# Patient Record
Sex: FEMALE | Race: WHITE
[De-identification: names, ages, dates, MRNs, and addresses within clinical notes are randomized per-mention and may not be internally consistent; named-entity substitution may affect disease eponyms.]

---

## 2019-01-17 ENCOUNTER — HOSPITAL ENCOUNTER (EMERGENCY)
Dept: HOSPITAL 10 - FTE | Age: 24
Discharge: HOME | End: 2019-01-17
Payer: COMMERCIAL

## 2019-01-17 ENCOUNTER — HOSPITAL ENCOUNTER (EMERGENCY)
Dept: HOSPITAL 91 - FTE | Age: 24
Discharge: HOME | End: 2019-01-17
Payer: COMMERCIAL

## 2019-01-17 VITALS — SYSTOLIC BLOOD PRESSURE: 108 MMHG | HEART RATE: 80 BPM | RESPIRATION RATE: 18 BRPM | DIASTOLIC BLOOD PRESSURE: 67 MMHG

## 2019-01-17 VITALS
BODY MASS INDEX: 12.75 KG/M2 | BODY MASS INDEX: 12.75 KG/M2 | HEIGHT: 71 IN | WEIGHT: 91.05 LBS | WEIGHT: 91.05 LBS | HEIGHT: 71 IN

## 2019-01-17 DIAGNOSIS — R10.11: Primary | ICD-10-CM

## 2019-01-17 DIAGNOSIS — R11.10: ICD-10-CM

## 2019-01-17 LAB
ADD MAN DIFF?: NO
ADD UMIC: NO
ALANINE AMINOTRANSFERASE: 16 IU/L (ref 13–69)
ALBUMIN/GLOBULIN RATIO: 1.53
ALBUMIN: 4.3 G/DL (ref 3.3–4.9)
ALKALINE PHOSPHATASE: 44 IU/L (ref 42–121)
AMYLASE: 72 U/L (ref 11–123)
ANION GAP: 7 (ref 5–13)
ASPARTATE AMINO TRANSFERASE: 22 IU/L (ref 15–46)
BASOPHIL #: 0 10^3/UL (ref 0–0.1)
BASOPHILS %: 0.5 % (ref 0–2)
BILIRUBIN,DIRECT: 0 MG/DL (ref 0–0.2)
BILIRUBIN,TOTAL: 0.4 MG/DL (ref 0.2–1.3)
BLOOD UREA NITROGEN: 14 MG/DL (ref 7–20)
CALCIUM: 9.5 MG/DL (ref 8.4–10.2)
CARBON DIOXIDE: 31 MMOL/L (ref 21–31)
CHLORIDE: 101 MMOL/L (ref 97–110)
CREATININE: 0.52 MG/DL (ref 0.44–1)
EOSINOPHILS #: 0.1 10^3/UL (ref 0–0.5)
EOSINOPHILS %: 1 % (ref 0–7)
GLOBULIN: 2.8 G/DL (ref 1.3–3.2)
GLUCOSE: 115 MG/DL (ref 70–220)
HEMATOCRIT: 39.5 % (ref 37–47)
HEMOGLOBIN: 13 G/DL (ref 12–16)
IMMATURE GRANS #M: 0.01 10^3/UL (ref 0–0.03)
IMMATURE GRANS % (M): 0.1 % (ref 0–0.43)
LIPASE: 57 U/L (ref 23–300)
LYMPHOCYTES #: 2.8 10^3/UL (ref 0.8–2.9)
LYMPHOCYTES %: 35.3 % (ref 15–51)
MEAN CORPUSCULAR HEMOGLOBIN: 30.7 PG (ref 29–33)
MEAN CORPUSCULAR HGB CONC: 32.9 G/DL (ref 32–37)
MEAN CORPUSCULAR VOLUME: 93.4 FL (ref 82–101)
MEAN PLATELET VOLUME: 9.5 FL (ref 7.4–10.4)
MONOCYTE #: 0.4 10^3/UL (ref 0.3–0.9)
MONOCYTES %: 4.6 % (ref 0–11)
NEUTROPHIL #: 4.6 10^3/UL (ref 1.6–7.5)
NEUTROPHILS %: 58.5 % (ref 39–77)
NUCLEATED RED BLOOD CELLS #: 0 10^3/UL (ref 0–0)
NUCLEATED RED BLOOD CELLS%: 0 /100WBC (ref 0–0)
PLATELET COUNT: 194 10^3/UL (ref 140–415)
POTASSIUM: 3.7 MMOL/L (ref 3.5–5.1)
RED BLOOD COUNT: 4.23 10^6/UL (ref 4.2–5.4)
RED CELL DISTRIBUTION WIDTH: 13 % (ref 11.5–14.5)
SODIUM: 139 MMOL/L (ref 135–144)
TOTAL PROTEIN: 7.1 G/DL (ref 6.1–8.1)
UR ASCORBIC ACID: 20 MG/DL
UR BILIRUBIN (DIP): NEGATIVE MG/DL
UR BLOOD (DIP): NEGATIVE MG/DL
UR CLARITY: (no result)
UR COLOR: YELLOW
UR GLUCOSE (DIP): NEGATIVE MG/DL
UR KETONES (DIP): (no result) MG/DL
UR LEUKOCYTE ESTERASE (DIP): NEGATIVE LEU/UL
UR MUCUS: (no result) /HPF
UR NITRITE (DIP): NEGATIVE MG/DL
UR PH (DIP): 5 (ref 5–9)
UR RBC: 3 /HPF (ref 0–5)
UR SPECIFIC GRAVITY (DIP): 1.02 (ref 1–1.03)
UR SQUAMOUS EPITHELIAL CELL: (no result) /HPF
UR TOTAL PROTEIN (DIP): NEGATIVE MG/DL
UR UROBILINOGEN (DIP): NEGATIVE MG/DL
UR WBC: 4 /HPF (ref 0–5)
WHITE BLOOD COUNT: 7.8 10^3/UL (ref 4.8–10.8)

## 2019-01-17 PROCEDURE — 76705 ECHO EXAM OF ABDOMEN: CPT

## 2019-01-17 PROCEDURE — 87086 URINE CULTURE/COLONY COUNT: CPT

## 2019-01-17 PROCEDURE — 85025 COMPLETE CBC W/AUTO DIFF WBC: CPT

## 2019-01-17 PROCEDURE — 80053 COMPREHEN METABOLIC PANEL: CPT

## 2019-01-17 PROCEDURE — 81001 URINALYSIS AUTO W/SCOPE: CPT

## 2019-01-17 PROCEDURE — 82150 ASSAY OF AMYLASE: CPT

## 2019-01-17 PROCEDURE — 36415 COLL VENOUS BLD VENIPUNCTURE: CPT

## 2019-01-17 PROCEDURE — 81025 URINE PREGNANCY TEST: CPT

## 2019-01-17 PROCEDURE — 81003 URINALYSIS AUTO W/O SCOPE: CPT

## 2019-01-17 PROCEDURE — 99284 EMERGENCY DEPT VISIT MOD MDM: CPT

## 2019-01-17 PROCEDURE — 83690 ASSAY OF LIPASE: CPT

## 2019-01-17 RX ADMIN — ALUMINUM HYDROXIDE, MAGNESIUM HYDROXIDE, DIMETHICONE 1 ML: 200; 200; 20 SUSPENSION ORAL at 01:19

## 2019-01-17 RX ADMIN — ONDANSETRON 1 MG: 4 TABLET, ORALLY DISINTEGRATING ORAL at 01:19

## 2019-01-17 NOTE — ERD
ER Documentation


Chief Complaint


Chief Complaint





C/O RT UPPER QUADRANT AP RADIATING TO BACK X1.5HRS, C/O NAUSEA





HPI


This is a 23-year-old female presents emergency department with complaints of 


right upper abdominal pain that engaged back.  Vomited twice with nonbilious 


nonbloody emesis.





LMP: Stated it she finished 2 weeks ago.  .





Denies headache, head injury, loss of consciousness, dizziness, neck pain, neck 


stiffness, throat pain, difficulty swallowing, difficulty breathing lying flat, 


shoulder pain, chest pain, back pain, constipation, diarrhea, urinary symptoms, 


pregnancy or possibility being pregnant, loss of bowel and bladder control, 


trauma, injury, falls, difficulty walking due to pain, numbness or tingling 


sensation, calf pain, recent travel, recent major surgery in the last 3 weeks, 


calf pain, recent long travel, recent exposure to any illness, recent antibiotic


use in the last 3 months, fever, chills, seizures.





Past medical history:


Surgical history:


Social: Denies smoking, use of alcoholic beverages, use of illegal drugs.





ROS


All systems reviewed and are negative except as per history of present illness.





Medications


Home Meds


Active Scripts


Famotidine* (Pepcid*) 20 Mg Tablet, 20 MG PO DAILY for 30 Days, TAB


   Prov:SASHA MONROY F         19


Ondansetron Hcl* (Zofran*) 4 Mg Tablet, 4 MG PO Q8H PRN for NAUSEA AND/OR V


OMITING, #30 TAB


   Prov:DENICEILAJERRY ROBERTSAR F         19


Acetaminophen* (Tylophen*) 500 Mg Capsule, 1 CAP PO Q6H PRN for PAIN AND OR 


ELEVATED TEMP, #20 CAP


   Prov:PASILABANJERRYAR F         19


Ibuprofen* (Motrin*) 400 Mg Tab, 400 MG PO Q6H PRN for PAIN AND OR ELEVATED 


TEMP, #30 TAB


   Prov:PASILABANJERRYAR F         19





Allergies


Allergies:  


Coded Allergies:  


     No Known Allergy (Unverified , 19)





Physical Exam


Vitals





Vital Signs


  Date      Temp  Pulse  Resp  B/P (MAP)   Pulse Ox  O2          O2 Flow    FiO2


Time                                                 Delivery    Rate


   19  97.9     70    20      112/71        98


     00:38                           (85)





Physical Exam


Const:   No acute distress


Head:   Atraumatic 


Eyes:    Normal Conjunctiva


ENT:    Normal External Ears, Nose and Mouth.


Neck:               Full range of motion. No meningismus.


Resp:   Clear to auscultation bilaterally


Cardio:   Regular rate and rhythm, no murmurs


Abd:    Soft, non tender, non distended. Normal bowel sounds.  Has right upper 


abdominal tenderness to light and deep palpation.  Negative Markle sign (heel 


jar test).  Negative Rovsing sign.  Able to jump 10 times without developing 


lower abdominal pain.


Skin:   No petechiae or rashes


Back:   No midline or flank tenderness


Ext:    No cyanosis, or edema


Neur:   Awake and alert.  No neurological deficit.


Psych:    Normal Mood and Affect


Result Diagram:  


19 0116                                                                    


           19 0116





Results 24 hrs





Laboratory Tests


      Test
                                  19
01:16  19
01:37


      White Blood Count                       7.8 10^3/ul


      Red Blood Count                        4.23 10^6/ul


      Hemoglobin                                13.0 g/dl


      Hematocrit                                   39.5 %


      Mean Corpuscular Volume                     93.4 fl


      Mean Corpuscular Hemoglobin                 30.7 pg


      Mean Corpuscular Hemoglobin
Concent      32.9 g/dl 
  



      Red Cell Distribution Width                  13.0 %


      Platelet Count                          194 10^3/UL


      Mean Platelet Volume                         9.5 fl


      Immature Granulocytes %                     0.100 %


      Neutrophils %                                58.5 %


      Lymphocytes %                                35.3 %


      Monocytes %                                   4.6 %


      Eosinophils %                                 1.0 %


      Basophils %                                   0.5 %


      Nucleated Red Blood Cells %             0.0 /100WBC


      Immature Granulocytes #               0.010 10^3/ul


      Neutrophils #                           4.6 10^3/ul


      Lymphocytes #                           2.8 10^3/ul


      Monocytes #                             0.4 10^3/ul


      Eosinophils #                           0.1 10^3/ul


      Basophils #                             0.0 10^3/ul


      Nucleated Red Blood Cells #             0.0 10^3/ul


      Urine Color                          YELLOW


      Urine Clarity
                       SLIGHTLY
CLOUDY  



      Urine pH                                        5.0


      Urine Specific Gravity                        1.019


      Urine Ketones                        TRACE mg/dL


      Urine Nitrite                        NEGATIVE mg/dL


      Urine Bilirubin                      NEGATIVE mg/dL


      Urine Urobilinogen                   NEGATIVE mg/dL


      Urine Leukocyte Esterase
            NEGATIVE
Liya/ul  



      Urine Microscopic RBC                        3 /HPF


      Urine Microscopic WBC                        4 /HPF


      Urine Squamous Epithelial
Cells      FEW /HPF 
       



      Urine Mucus                          FEW /HPF


      Urine Hemoglobin                     NEGATIVE mg/dL


      Urine Glucose                        NEGATIVE mg/dL


      Urine Total Protein                  NEGATIVE mg/dl


      Sodium Level                             139 mmol/L


      Potassium Level                          3.7 mmol/L


      Chloride Level                           101 mmol/L


      Carbon Dioxide Level                      31 mmol/L


      Anion Gap                                         7


      Blood Urea Nitrogen                        14 mg/dl


      Creatinine                               0.52 mg/dl


      Est Glomerular Filtrat Rate
mL/min   > 60 mL/min 
    



      Glucose Level                             115 mg/dl


      Calcium Level                             9.5 mg/dl


      Total Bilirubin                           0.4 mg/dl


      Direct Bilirubin                         0.00 mg/dl


      Indirect Bilirubin                        0.4 mg/dl


      Aspartate Amino Transf
(AST/SGOT)          22 IU/L 
  



      Alanine Aminotransferase
(ALT/SGPT)        16 IU/L 
  



      Alkaline Phosphatase                        44 IU/L


      Total Protein                              7.1 g/dl


      Albumin                                    4.3 g/dl


      Globulin                                  2.80 g/dl


      Albumin/Globulin Ratio                         1.53


      Amylase Level                                72 U/L


      Lipase                                       57 U/L


      POC Beta HCG, Qualitative                             NEGATIVE





Current Medications


 Medications
   Dose
          Sig/Jj
       Start Time
   Status  Last


 (Trade)       Ordered        Route
 PRN     Stop Time              Admin
Dose


                              Reason                                Admin


 Ondansetron    4 mg           ONCE  STAT
    19       DC           19


HCl
  (Zofran                 ODT
           01:04
                       01:19



Odt)                                         19 01:06


                40 ml          ONCE  ONCE
    19       DC           19


Miscellaneous                 PO
            01:30
                       01:19




 Medication
                                19 01:31


 (Gi Cocktail


(2))








Procedures/MDM


Diagnostic tests:


POC urine pregnancy: Negative.


Urinalysis: Reviewed.


Culture urine: Sent.


Blood works: Reviewed.


Gallbladder ultrasound: Study slightly limited by bowel gas.  No definite acute 


abnormality.





Treatment: Zofran ODT.  GI cocktail.





Re-evaluation: Denies abdominal pain.  No episode of emesis here in the 


emergency department.  Negative An sign.  Negative Markle sign (heel jar 


test).  Negative psoas sign.  Negative Rovsing sign.  Able to jump 10 times 


without developing lower abdominal pain.  No CVA tenderness.  Ambulatory with 


steady gait and without pain to abdomen.  Stated that she feels much better at 


this time and that she is ready to go home.





Differential diagnosis:


I have low suspicion for pancreatitis, cholecystitis, diverticulitis, 


diverticulitis with abscess, appendicitis, nephrolithiasis, pyelonephritis, 


septic stone, obstructing kidney stone, sepsis, severe dehydration.





Final diagnosis: Abdominal pain.





Prescription: Tylenol.  Motrin.  Zofran.  Pepcid.





Follow-up with PCP in the next 24-48 hours.  Come back here in the emergency 


department for any new symptoms or any worsening symptoms.  





All questions and concerns were answered.  Patient and family members verbalized


understanding and agreed with plan of care.  





Hemodynamically stable on discharge.





Departure


Diagnosis:  


   Primary Impression:  


   Abdominal pain


Condition:  Stable





Additional Instructions:  


Follow-up with PCP in the next 24-48 hours.  Come back here in the emergency 


department for any new symptoms or any worsening symptoms.











SASHA MONROY               2019 01:16

## 2019-04-30 ENCOUNTER — HOSPITAL ENCOUNTER (EMERGENCY)
Dept: HOSPITAL 10 - FTE | Age: 24
Discharge: LEFT BEFORE DISCHARGE | End: 2019-04-30
Payer: SELF-PAY

## 2019-04-30 ENCOUNTER — HOSPITAL ENCOUNTER (EMERGENCY)
Dept: HOSPITAL 91 - FTE | Age: 24
Discharge: LEFT BEFORE DISCHARGE | End: 2019-04-30
Payer: SELF-PAY

## 2019-04-30 VITALS
HEIGHT: 71 IN | HEIGHT: 71 IN | BODY MASS INDEX: 12.44 KG/M2 | WEIGHT: 88.85 LBS | WEIGHT: 88.85 LBS | BODY MASS INDEX: 12.44 KG/M2

## 2019-04-30 VITALS — DIASTOLIC BLOOD PRESSURE: 54 MMHG | SYSTOLIC BLOOD PRESSURE: 111 MMHG | HEART RATE: 71 BPM | RESPIRATION RATE: 18 BRPM

## 2019-04-30 DIAGNOSIS — Z53.21: Primary | ICD-10-CM
